# Patient Record
Sex: FEMALE | Race: WHITE
[De-identification: names, ages, dates, MRNs, and addresses within clinical notes are randomized per-mention and may not be internally consistent; named-entity substitution may affect disease eponyms.]

---

## 2019-08-04 ENCOUNTER — HOSPITAL ENCOUNTER (EMERGENCY)
Dept: HOSPITAL 91 - E/R | Age: 44
LOS: 1 days | Discharge: HOME | End: 2019-08-05
Payer: MEDICAID

## 2019-08-04 ENCOUNTER — HOSPITAL ENCOUNTER (EMERGENCY)
Dept: HOSPITAL 10 - E/R | Age: 44
LOS: 1 days | Discharge: HOME | End: 2019-08-05
Payer: MEDICAID

## 2019-08-04 VITALS
WEIGHT: 145.31 LBS | HEIGHT: 62 IN | BODY MASS INDEX: 26.74 KG/M2 | HEIGHT: 62 IN | WEIGHT: 145.31 LBS | BODY MASS INDEX: 26.74 KG/M2

## 2019-08-04 DIAGNOSIS — D25.9: Primary | ICD-10-CM

## 2019-08-04 DIAGNOSIS — D64.9: ICD-10-CM

## 2019-08-04 PROCEDURE — 80053 COMPREHEN METABOLIC PANEL: CPT

## 2019-08-04 PROCEDURE — 85025 COMPLETE CBC W/AUTO DIFF WBC: CPT

## 2019-08-04 PROCEDURE — 36415 COLL VENOUS BLD VENIPUNCTURE: CPT

## 2019-08-04 PROCEDURE — 96374 THER/PROPH/DIAG INJ IV PUSH: CPT

## 2019-08-04 PROCEDURE — 81025 URINE PREGNANCY TEST: CPT

## 2019-08-04 PROCEDURE — 96361 HYDRATE IV INFUSION ADD-ON: CPT

## 2019-08-04 PROCEDURE — 99284 EMERGENCY DEPT VISIT MOD MDM: CPT

## 2019-08-05 VITALS — DIASTOLIC BLOOD PRESSURE: 80 MMHG | RESPIRATION RATE: 18 BRPM | SYSTOLIC BLOOD PRESSURE: 107 MMHG | HEART RATE: 60 BPM

## 2019-08-05 LAB
ABNORMAL IP MESSAGE: 1
ADD MAN DIFF?: NO
ALANINE AMINOTRANSFERASE: 22 IU/L (ref 13–69)
ALBUMIN/GLOBULIN RATIO: 1.63
ALBUMIN: 4.9 G/DL (ref 3.3–4.9)
ALKALINE PHOSPHATASE: 71 IU/L (ref 42–121)
ANION GAP: 11 (ref 5–13)
ASPARTATE AMINO TRANSFERASE: 29 IU/L (ref 15–46)
BASOPHIL #: 0.1 10^3/UL (ref 0–0.1)
BASOPHILS %: 0.6 % (ref 0–2)
BILIRUBIN,DIRECT: 0 MG/DL (ref 0–0.2)
BILIRUBIN,TOTAL: 0.2 MG/DL (ref 0.2–1.3)
BLOOD UREA NITROGEN: 9 MG/DL (ref 7–20)
CALCIUM: 9.7 MG/DL (ref 8.4–10.2)
CARBON DIOXIDE: 27 MMOL/L (ref 21–31)
CHLORIDE: 104 MMOL/L (ref 97–110)
CREATININE: 0.65 MG/DL (ref 0.44–1)
EOSINOPHILS #: 0.1 10^3/UL (ref 0–0.5)
EOSINOPHILS %: 1.5 % (ref 0–7)
GLOBULIN: 3 G/DL (ref 1.3–3.2)
GLUCOSE: 103 MG/DL (ref 70–220)
HEMATOCRIT: 28.9 % (ref 37–47)
HEMOGLOBIN: 8.3 G/DL (ref 12–16)
IMMATURE GRANS #M: 0.02 10^3/UL (ref 0–0.03)
IMMATURE GRANS % (M): 0.2 % (ref 0–0.43)
LYMPHOCYTES #: 2.8 10^3/UL (ref 0.8–2.9)
LYMPHOCYTES %: 31.7 % (ref 15–51)
MEAN CORPUSCULAR HEMOGLOBIN: 21.1 PG (ref 29–33)
MEAN CORPUSCULAR HGB CONC: 28.7 G/DL (ref 32–37)
MEAN CORPUSCULAR VOLUME: 73.5 FL (ref 82–101)
MEAN PLATELET VOLUME: 9.5 FL (ref 7.4–10.4)
MONOCYTE #: 0.8 10^3/UL (ref 0.3–0.9)
MONOCYTES %: 9.4 % (ref 0–11)
NEUTROPHIL #: 4.9 10^3/UL (ref 1.6–7.5)
NEUTROPHILS %: 56.6 % (ref 39–77)
NUCLEATED RED BLOOD CELLS #: 0 10^3/UL (ref 0–0)
NUCLEATED RED BLOOD CELLS%: 0 /100WBC (ref 0–0)
PLATELET COUNT: 467 10^3/UL (ref 140–415)
POSITIVE DIFF: (no result)
POTASSIUM: 4 MMOL/L (ref 3.5–5.1)
RED BLOOD COUNT: 3.93 10^6/UL (ref 4.2–5.4)
RED CELL DISTRIBUTION WIDTH: 19.4 % (ref 11.5–14.5)
SODIUM: 142 MMOL/L (ref 135–144)
TOTAL PROTEIN: 7.9 G/DL (ref 6.1–8.1)
WHITE BLOOD COUNT: 8.7 10^3/UL (ref 4.8–10.8)

## 2019-08-05 RX ADMIN — ONDANSETRON HYDROCHLORIDE 1 MG: 2 INJECTION, SOLUTION INTRAMUSCULAR; INTRAVENOUS at 01:40

## 2019-08-05 RX ADMIN — THIAMINE HYDROCHLORIDE 1 MLS/HR: 100 INJECTION, SOLUTION INTRAMUSCULAR; INTRAVENOUS at 00:36

## 2019-08-05 NOTE — ERD
ER Documentation


Chief Complaint


Chief Complaint





DIZZINESS WITH ANXIETY; STATES FELT DIZZY AFTER TAKING MEDS; SEE NOTES





HPI


This is a 44-year-old female with a history of very large uterine fibroids who 


is having 10 days of light watery vaginal bleeding with no pain.  She is already


taking a drug Lystera but it is not helping as much.  She is going to have her 


uterus removed and she is waiting for approval by the insurance.  Is Elias been 


2 weeks and she still waiting.  She does feel a bit dizzy and weak and she has a


history of anemia and is Elias getting iron.  She is very tried the birth 


control pill without success.  No syncope no blood clots





ROS


All systems reviewed and are negative except as per history of present illness.





Allergies


Allergies:  


Coded Allergies:  


     No Known Allergy (Unverified , 8/5/19)





FmHx


Family History:  No coronary disease





Physical Exam


Vitals





Vital Signs


  Date      Temp  Pulse  Resp  B/P (MAP)   Pulse Ox  O2          O2 Flow    FiO2


Time                                                 Delivery    Rate


    8/4/19  98.7     63    19      135/71       100


     23:27                           (92)





Physical Exam


 Const:      Well-developed, well-nourished


 Head:        Atraumatic, normocephalic


 Eyes:       Normal Conjunctiva, PERRLA, EOMI, normal sclera, no nystagmus


 ENT:         Normal External Ears, Nose and Mouth, moist mucus membranes.


 Neck:        Full range of motion.  No meningismus, no lymphadenopathy.


 Resp:         Clear to auscultation bilaterally, no wheezing, rhonchi, rales


 Cardio:       Regular rate and rhythm, no murmurs, S1 S2 present


 Abd:         Soft, non tender x 4, non distended, large palpable uterine 


fibroids. Normal bowel sounds, no guarding or rebound, no pulsitile abdominal 


masses or bruits


 Skin:         No petechiae or rashes, no ecchymosis , no maculopapular rash


 Back:        No midline or flank tenderness


 Ext:          No cyanosis, or edema, FROM x 4, normal inspection, 


neurovascularly intact x 4


 Neur:        Awake and alert, STR 5/5 x 4, sensation intact x 4, no focal 


findings, cerebellum intact


 Psych:        Normal Mood and Affect


Result Diagram:  


8/5/19 0028                                                                     


          8/5/19 0028





Results 24 hrs





Laboratory Tests


       Test
                                  8/5/19
00:28  8/5/19
00:51


       White Blood Count                      8.7 10^3/ul


       Red Blood Count                       3.93 10^6/ul


       Hemoglobin                                8.3 g/dl


       Hematocrit                                  28.9 %


       Mean Corpuscular Volume                    73.5 fl


       Mean Corpuscular Hemoglobin                21.1 pg


       Mean Corpuscular Hemoglobin
Concent     28.7 g/dl 
  



       Red Cell Distribution Width                 19.4 %


       Platelet Count                         467 10^3/UL


       Mean Platelet Volume                        9.5 fl


       Immature Granulocytes %                    0.200 %


       Neutrophils %                               56.6 %


       Lymphocytes %                               31.7 %


       Monocytes %                                  9.4 %


       Eosinophils %                                1.5 %


       Basophils %                                  0.6 %


       Nucleated Red Blood Cells %            0.0 /100WBC


       Immature Granulocytes #              0.020 10^3/ul


       Neutrophils #                          4.9 10^3/ul


       Lymphocytes #                          2.8 10^3/ul


       Monocytes #                            0.8 10^3/ul


       Eosinophils #                          0.1 10^3/ul


       Basophils #                            0.1 10^3/ul


       Nucleated Red Blood Cells #            0.0 10^3/ul


       Sodium Level                            142 mmol/L


       Potassium Level                         4.0 mmol/L


       Chloride Level                          104 mmol/L


       Carbon Dioxide Level                     27 mmol/L


       Anion Gap                                       11


       Blood Urea Nitrogen                        9 mg/dl


       Creatinine                              0.65 mg/dl


       Est Glomerular Filtrat Rate
mL/min   > 60 mL/min 
   



       Glucose Level                            103 mg/dl


       Calcium Level                            9.7 mg/dl


       Total Bilirubin                          0.2 mg/dl


       Direct Bilirubin                        0.00 mg/dl


       Indirect Bilirubin                       0.2 mg/dl


       Aspartate Amino Transf
(AST/SGOT)         29 IU/L 
  



       Alanine Aminotransferase
(ALT/SGPT)       22 IU/L 
  



       Alkaline Phosphatase                       71 IU/L


       Total Protein                             7.9 g/dl


       Albumin                                   4.9 g/dl


       Globulin                                 3.00 g/dl


       Albumin/Globulin Ratio                        1.63


       POC Beta HCG, Qualitative                            NEGATIVE





Current Medications


 Medications
   Dose
          Sig/Steve
       Start Time
   Status  Last


 (Trade)       Ordered        Route
 PRN     Stop Time              Admin
Dose


                              Reason                                Admin


 Sodium         1,000 ml @ 
   Q1H STAT
      8/5/19        DC            8/5/19


Chloride       1,000 mls/hr   IV
            00:15
 8/5/19                00:36



                                             01:14


 Ondansetron    4 mg           ONCE  STAT
    8/5/19        DC            8/5/19


HCl
  (Zofran                 IV
            01:25
 8/5/19                01:40



Inj)                                         01:36








Procedures/MDM


Spoke with OB/GYN on-call/laborist who said at this point the only thing we can 


give his Lupron however the hospital does not have Lupron.  I called the 


pharmacy to verify this.





The patient has some anemia with hemoglobin of 8.3 but is not critical and needs


a blood transfusion at this point.





She is feeling better.  I will discharge home and have her call her OB GEN 


tomorrow morning to go into the office for a Lupron injection as this is her 


best bet to stop the bleeding at this time





Departure


Diagnosis:  


   Primary Impression:  


   Vaginal bleeding


   Additional Impressions:  


   Uterine fibroid


   Uterine leiomyoma location:  unspecified location  Qualified Codes:  D25.9 - 


   Leiomyoma of uterus, unspecified


   Anemia


   Anemia type:  unspecified type  Qualified Codes:  D64.9 - Anemia, unspecified


Condition:  Stable











AMRIT ROACH DO          Aug 5, 2019 02:31